# Patient Record
Sex: FEMALE | Race: WHITE | Employment: OTHER | URBAN - METROPOLITAN AREA
[De-identification: names, ages, dates, MRNs, and addresses within clinical notes are randomized per-mention and may not be internally consistent; named-entity substitution may affect disease eponyms.]

---

## 2017-04-28 PROBLEM — H91.90 HARD OF HEARING: Chronic | Status: ACTIVE | Noted: 2017-04-28

## 2017-04-28 PROBLEM — R53.1 WEAKNESS: Status: ACTIVE | Noted: 2017-04-28

## 2017-04-28 PROBLEM — M85.80 OSTEOPENIA: Chronic | Status: ACTIVE | Noted: 2017-04-28

## 2017-04-28 PROBLEM — M19.90 OA (OSTEOARTHRITIS): Chronic | Status: ACTIVE | Noted: 2017-04-28

## 2017-04-28 PROBLEM — R53.1 WEAKNESS: Status: RESOLVED | Noted: 2017-04-28 | Resolved: 2017-04-28

## 2017-04-29 PROBLEM — S22.080A COMPRESSION FRACTURE OF TWELFTH THORACIC VERTEBRA (HCC): Status: ACTIVE | Noted: 2017-04-29

## 2017-04-29 PROBLEM — R19.5 OCCULT BLOOD IN STOOLS: Status: ACTIVE | Noted: 2017-04-29

## 2017-04-29 PROBLEM — S32.020A CLOSED COMPRESSION FRACTURE OF SECOND LUMBAR VERTEBRA (HCC): Status: ACTIVE | Noted: 2017-04-29

## 2017-04-29 PROBLEM — S32.030A CLOSED COMPRESSION FRACTURE OF THIRD LUMBAR VERTEBRA (HCC): Status: ACTIVE | Noted: 2017-04-29

## 2017-04-30 PROBLEM — M79.604 BILATERAL LEG PAIN: Status: ACTIVE | Noted: 2017-04-30

## 2017-04-30 PROBLEM — M79.605 BILATERAL LEG PAIN: Status: ACTIVE | Noted: 2017-04-30

## 2018-05-10 ENCOUNTER — HOSPITAL ENCOUNTER (OUTPATIENT)
Dept: GENERAL RADIOLOGY | Age: 83
Discharge: HOME | End: 2018-05-12
Payer: COMMERCIAL

## 2018-05-10 DIAGNOSIS — R13.10 PROBLEMS WITH SWALLOWING AND MASTICATION: ICD-10-CM

## 2018-05-10 PROCEDURE — G8997 SWALLOW GOAL STATUS: HCPCS

## 2018-05-10 PROCEDURE — 74230 X-RAY XM SWLNG FUNCJ C+: CPT

## 2018-05-10 PROCEDURE — G8996 SWALLOW CURRENT STATUS: HCPCS

## 2018-05-10 PROCEDURE — 92611 MOTION FLUOROSCOPY/SWALLOW: CPT

## 2019-01-21 ENCOUNTER — TELEPHONE (OUTPATIENT)
Dept: VASCULAR SURGERY | Age: 84
End: 2019-01-21

## 2019-03-26 ENCOUNTER — OFFICE VISIT (OUTPATIENT)
Dept: VASCULAR SURGERY | Age: 84
End: 2019-03-26
Payer: COMMERCIAL

## 2019-03-26 VITALS — DIASTOLIC BLOOD PRESSURE: 72 MMHG | SYSTOLIC BLOOD PRESSURE: 124 MMHG | HEART RATE: 64 BPM

## 2019-03-26 DIAGNOSIS — I73.9 PAD (PERIPHERAL ARTERY DISEASE) (HCC): Primary | ICD-10-CM

## 2019-03-26 PROCEDURE — 99203 OFFICE O/P NEW LOW 30 MIN: CPT | Performed by: SURGERY

## 2019-04-18 ENCOUNTER — HOSPITAL ENCOUNTER (OUTPATIENT)
Age: 84
Discharge: HOME | End: 2019-04-20
Payer: COMMERCIAL

## 2019-04-18 PROCEDURE — 87088 URINE BACTERIA CULTURE: CPT

## 2019-04-21 LAB — URINE CULTURE, ROUTINE: NORMAL

## 2021-01-01 ENCOUNTER — HOSPITAL ENCOUNTER (INPATIENT)
Age: 86
LOS: 2 days | Discharge: HOSPICE | DRG: 871 | End: 2021-10-05
Attending: EMERGENCY MEDICINE | Admitting: INTERNAL MEDICINE
Payer: MEDICARE

## 2021-01-01 ENCOUNTER — OUTSIDE SERVICES (OUTPATIENT)
Dept: PODIATRY | Age: 86
End: 2021-01-01
Payer: MEDICARE

## 2021-01-01 ENCOUNTER — OUTSIDE SERVICES (OUTPATIENT)
Dept: PODIATRY | Age: 86
End: 2021-01-01
Payer: COMMERCIAL

## 2021-01-01 ENCOUNTER — APPOINTMENT (OUTPATIENT)
Dept: GENERAL RADIOLOGY | Age: 86
DRG: 871 | End: 2021-01-01
Payer: MEDICARE

## 2021-01-01 ENCOUNTER — APPOINTMENT (OUTPATIENT)
Dept: CT IMAGING | Age: 86
DRG: 871 | End: 2021-01-01
Payer: MEDICARE

## 2021-01-01 VITALS
DIASTOLIC BLOOD PRESSURE: 55 MMHG | BODY MASS INDEX: 15.79 KG/M2 | HEART RATE: 85 BPM | OXYGEN SATURATION: 93 % | TEMPERATURE: 99.5 F | WEIGHT: 73.2 LBS | SYSTOLIC BLOOD PRESSURE: 113 MMHG | HEIGHT: 57 IN | RESPIRATION RATE: 16 BRPM

## 2021-01-01 DIAGNOSIS — M79.675 PAIN IN LEFT TOE(S): ICD-10-CM

## 2021-01-01 DIAGNOSIS — B35.1 TINEA UNGUIUM: Primary | ICD-10-CM

## 2021-01-01 DIAGNOSIS — R62.7 FAILURE TO THRIVE IN ADULT: ICD-10-CM

## 2021-01-01 DIAGNOSIS — I73.9 PERIPHERAL VASCULAR DISEASE, UNSPECIFIED (HCC): ICD-10-CM

## 2021-01-01 DIAGNOSIS — R26.2 DIFFICULTY WALKING: ICD-10-CM

## 2021-01-01 DIAGNOSIS — J69.0 ASPIRATION PNEUMONIA OF RIGHT LOWER LOBE DUE TO GASTRIC SECRETIONS (HCC): Primary | ICD-10-CM

## 2021-01-01 DIAGNOSIS — M79.674 PAIN IN TOE OF RIGHT FOOT: ICD-10-CM

## 2021-01-01 DIAGNOSIS — E86.0 DEHYDRATION: ICD-10-CM

## 2021-01-01 LAB
ALBUMIN SERPL-MCNC: 3.2 G/DL (ref 3.5–5.2)
ALBUMIN SERPL-MCNC: 3.7 G/DL (ref 3.5–5.2)
ALP BLD-CCNC: 163 U/L (ref 35–104)
ALP BLD-CCNC: 195 U/L (ref 35–104)
ALT SERPL-CCNC: 20 U/L (ref 0–32)
ALT SERPL-CCNC: 23 U/L (ref 0–32)
ANION GAP SERPL CALCULATED.3IONS-SCNC: 12 MMOL/L (ref 7–16)
ANION GAP SERPL CALCULATED.3IONS-SCNC: 12 MMOL/L (ref 7–16)
ANISOCYTOSIS: ABNORMAL
AST SERPL-CCNC: 17 U/L (ref 0–31)
AST SERPL-CCNC: 19 U/L (ref 0–31)
BACTERIA: ABNORMAL /HPF
BASOPHILIC STIPPLING: ABNORMAL
BASOPHILS ABSOLUTE: 0.03 E9/L (ref 0–0.2)
BASOPHILS RELATIVE PERCENT: 0.2 % (ref 0–2)
BILIRUB SERPL-MCNC: 0.6 MG/DL (ref 0–1.2)
BILIRUB SERPL-MCNC: 0.9 MG/DL (ref 0–1.2)
BILIRUBIN URINE: ABNORMAL
BLOOD, URINE: NEGATIVE
BUN BLDV-MCNC: 27 MG/DL (ref 6–23)
BUN BLDV-MCNC: 28 MG/DL (ref 6–23)
CALCIUM SERPL-MCNC: 8.3 MG/DL (ref 8.6–10.2)
CALCIUM SERPL-MCNC: 9 MG/DL (ref 8.6–10.2)
CHLORIDE BLD-SCNC: 101 MMOL/L (ref 98–107)
CHLORIDE BLD-SCNC: 107 MMOL/L (ref 98–107)
CLARITY: ABNORMAL
CO2: 26 MMOL/L (ref 22–29)
CO2: 29 MMOL/L (ref 22–29)
COLOR: YELLOW
CREAT SERPL-MCNC: 0.4 MG/DL (ref 0.5–1)
CREAT SERPL-MCNC: 0.4 MG/DL (ref 0.5–1)
EKG ATRIAL RATE: 103 BPM
EKG P AXIS: 109 DEGREES
EKG P-R INTERVAL: 136 MS
EKG Q-T INTERVAL: 344 MS
EKG QRS DURATION: 66 MS
EKG QTC CALCULATION (BAZETT): 450 MS
EKG R AXIS: 111 DEGREES
EKG T AXIS: 15 DEGREES
EKG VENTRICULAR RATE: 103 BPM
EOSINOPHILS ABSOLUTE: 0.01 E9/L (ref 0.05–0.5)
EOSINOPHILS RELATIVE PERCENT: 0.1 % (ref 0–6)
FERRITIN: 252 NG/ML
FOLATE: >20 NG/ML (ref 4.8–24.2)
GFR AFRICAN AMERICAN: >60
GFR AFRICAN AMERICAN: >60
GFR NON-AFRICAN AMERICAN: >60 ML/MIN/1.73
GFR NON-AFRICAN AMERICAN: >60 ML/MIN/1.73
GLUCOSE BLD-MCNC: 114 MG/DL (ref 74–99)
GLUCOSE BLD-MCNC: 136 MG/DL (ref 74–99)
GLUCOSE URINE: NEGATIVE MG/DL
HCT VFR BLD CALC: 32 % (ref 34–48)
HCT VFR BLD CALC: 36.5 % (ref 34–48)
HEMOGLOBIN: 11.7 G/DL (ref 11.5–15.5)
HEMOGLOBIN: 9.8 G/DL (ref 11.5–15.5)
IMMATURE GRANULOCYTES #: 0.09 E9/L
IMMATURE GRANULOCYTES %: 0.5 % (ref 0–5)
IRON SATURATION: 13 % (ref 15–50)
IRON: 31 MCG/DL (ref 37–145)
KETONES, URINE: 15 MG/DL
LACTIC ACID: 1.6 MMOL/L (ref 0.5–2.2)
LEUKOCYTE ESTERASE, URINE: NEGATIVE
LYMPHOCYTES ABSOLUTE: 0.33 E9/L (ref 1.5–4)
LYMPHOCYTES RELATIVE PERCENT: 1.8 % (ref 20–42)
MAGNESIUM: 2.1 MG/DL (ref 1.6–2.6)
MCH RBC QN AUTO: 30.7 PG (ref 26–35)
MCH RBC QN AUTO: 31 PG (ref 26–35)
MCHC RBC AUTO-ENTMCNC: 30.6 % (ref 32–34.5)
MCHC RBC AUTO-ENTMCNC: 32.1 % (ref 32–34.5)
MCV RBC AUTO: 100.3 FL (ref 80–99.9)
MCV RBC AUTO: 96.8 FL (ref 80–99.9)
METER GLUCOSE: 104 MG/DL (ref 74–99)
MONOCYTES ABSOLUTE: 0.59 E9/L (ref 0.1–0.95)
MONOCYTES RELATIVE PERCENT: 3.3 % (ref 2–12)
NEUTROPHILS ABSOLUTE: 16.92 E9/L (ref 1.8–7.3)
NEUTROPHILS RELATIVE PERCENT: 94.1 % (ref 43–80)
NITRITE, URINE: NEGATIVE
PDW BLD-RTO: 14.2 FL (ref 11.5–15)
PDW BLD-RTO: 14.3 FL (ref 11.5–15)
PH UA: 5.5 (ref 5–9)
PLATELET # BLD: 481 E9/L (ref 130–450)
PLATELET # BLD: 569 E9/L (ref 130–450)
PMV BLD AUTO: 8.3 FL (ref 7–12)
PMV BLD AUTO: 8.5 FL (ref 7–12)
POTASSIUM SERPL-SCNC: 4.1 MMOL/L (ref 3.5–5)
POTASSIUM SERPL-SCNC: 4.4 MMOL/L (ref 3.5–5)
PROCALCITONIN: 0.24 NG/ML (ref 0–0.08)
PROTEIN UA: 30 MG/DL
RBC # BLD: 3.19 E12/L (ref 3.5–5.5)
RBC # BLD: 3.77 E12/L (ref 3.5–5.5)
RBC UA: ABNORMAL /HPF (ref 0–2)
SARS-COV-2, NAAT: NOT DETECTED
SARS-COV-2, NAAT: NOT DETECTED
SODIUM BLD-SCNC: 142 MMOL/L (ref 132–146)
SODIUM BLD-SCNC: 145 MMOL/L (ref 132–146)
SPECIFIC GRAVITY UA: >=1.03 (ref 1–1.03)
TOTAL IRON BINDING CAPACITY: 247 MCG/DL (ref 250–450)
TOTAL PROTEIN: 5.8 G/DL (ref 6.4–8.3)
TOTAL PROTEIN: 6.9 G/DL (ref 6.4–8.3)
URINE CULTURE, ROUTINE: NORMAL
UROBILINOGEN, URINE: 1 E.U./DL
VITAMIN B-12: >2000 PG/ML (ref 211–946)
WBC # BLD: 17.7 E9/L (ref 4.5–11.5)
WBC # BLD: 18 E9/L (ref 4.5–11.5)
WBC UA: ABNORMAL /HPF (ref 0–5)

## 2021-01-01 PROCEDURE — 6360000002 HC RX W HCPCS: Performed by: INTERNAL MEDICINE

## 2021-01-01 PROCEDURE — G0378 HOSPITAL OBSERVATION PER HR: HCPCS

## 2021-01-01 PROCEDURE — 1200000000 HC SEMI PRIVATE

## 2021-01-01 PROCEDURE — 87088 URINE BACTERIA CULTURE: CPT

## 2021-01-01 PROCEDURE — 93010 ELECTROCARDIOGRAM REPORT: CPT | Performed by: INTERNAL MEDICINE

## 2021-01-01 PROCEDURE — 87635 SARS-COV-2 COVID-19 AMP PRB: CPT

## 2021-01-01 PROCEDURE — 97161 PT EVAL LOW COMPLEX 20 MIN: CPT

## 2021-01-01 PROCEDURE — 85027 COMPLETE CBC AUTOMATED: CPT

## 2021-01-01 PROCEDURE — 96361 HYDRATE IV INFUSION ADD-ON: CPT

## 2021-01-01 PROCEDURE — 82728 ASSAY OF FERRITIN: CPT

## 2021-01-01 PROCEDURE — 96366 THER/PROPH/DIAG IV INF ADDON: CPT

## 2021-01-01 PROCEDURE — 2580000003 HC RX 258: Performed by: EMERGENCY MEDICINE

## 2021-01-01 PROCEDURE — 99307 SBSQ NF CARE SF MDM 10: CPT | Performed by: PODIATRIST

## 2021-01-01 PROCEDURE — 80053 COMPREHEN METABOLIC PANEL: CPT

## 2021-01-01 PROCEDURE — 2580000003 HC RX 258: Performed by: INTERNAL MEDICINE

## 2021-01-01 PROCEDURE — 96372 THER/PROPH/DIAG INJ SC/IM: CPT

## 2021-01-01 PROCEDURE — 97165 OT EVAL LOW COMPLEX 30 MIN: CPT

## 2021-01-01 PROCEDURE — 96365 THER/PROPH/DIAG IV INF INIT: CPT

## 2021-01-01 PROCEDURE — 83550 IRON BINDING TEST: CPT

## 2021-01-01 PROCEDURE — 6360000002 HC RX W HCPCS: Performed by: EMERGENCY MEDICINE

## 2021-01-01 PROCEDURE — 92610 EVALUATE SWALLOWING FUNCTION: CPT

## 2021-01-01 PROCEDURE — 83605 ASSAY OF LACTIC ACID: CPT

## 2021-01-01 PROCEDURE — 93005 ELECTROCARDIOGRAM TRACING: CPT | Performed by: EMERGENCY MEDICINE

## 2021-01-01 PROCEDURE — 99222 1ST HOSP IP/OBS MODERATE 55: CPT | Performed by: OTOLARYNGOLOGY

## 2021-01-01 PROCEDURE — 82607 VITAMIN B-12: CPT

## 2021-01-01 PROCEDURE — 84145 PROCALCITONIN (PCT): CPT

## 2021-01-01 PROCEDURE — 70450 CT HEAD/BRAIN W/O DYE: CPT

## 2021-01-01 PROCEDURE — 36415 COLL VENOUS BLD VENIPUNCTURE: CPT

## 2021-01-01 PROCEDURE — 85025 COMPLETE CBC W/AUTO DIFF WBC: CPT

## 2021-01-01 PROCEDURE — 11721 DEBRIDE NAIL 6 OR MORE: CPT | Performed by: PODIATRIST

## 2021-01-01 PROCEDURE — 83540 ASSAY OF IRON: CPT

## 2021-01-01 PROCEDURE — 97535 SELF CARE MNGMENT TRAINING: CPT

## 2021-01-01 PROCEDURE — 82962 GLUCOSE BLOOD TEST: CPT

## 2021-01-01 PROCEDURE — 99284 EMERGENCY DEPT VISIT MOD MDM: CPT

## 2021-01-01 PROCEDURE — 2700000000 HC OXYGEN THERAPY PER DAY

## 2021-01-01 PROCEDURE — 83735 ASSAY OF MAGNESIUM: CPT

## 2021-01-01 PROCEDURE — 71045 X-RAY EXAM CHEST 1 VIEW: CPT

## 2021-01-01 PROCEDURE — 96360 HYDRATION IV INFUSION INIT: CPT

## 2021-01-01 PROCEDURE — 31575 DIAGNOSTIC LARYNGOSCOPY: CPT | Performed by: OTOLARYNGOLOGY

## 2021-01-01 PROCEDURE — 81001 URINALYSIS AUTO W/SCOPE: CPT

## 2021-01-01 PROCEDURE — 97530 THERAPEUTIC ACTIVITIES: CPT

## 2021-01-01 PROCEDURE — 73030 X-RAY EXAM OF SHOULDER: CPT

## 2021-01-01 PROCEDURE — 6370000000 HC RX 637 (ALT 250 FOR IP): Performed by: INTERNAL MEDICINE

## 2021-01-01 PROCEDURE — 82746 ASSAY OF FOLIC ACID SERUM: CPT

## 2021-01-01 RX ORDER — SODIUM CHLORIDE 9 MG/ML
25 INJECTION, SOLUTION INTRAVENOUS PRN
Status: DISCONTINUED | OUTPATIENT
Start: 2021-01-01 | End: 2021-01-01 | Stop reason: HOSPADM

## 2021-01-01 RX ORDER — ONDANSETRON 2 MG/ML
4 INJECTION INTRAMUSCULAR; INTRAVENOUS EVERY 6 HOURS PRN
Status: DISCONTINUED | OUTPATIENT
Start: 2021-01-01 | End: 2021-01-01 | Stop reason: HOSPADM

## 2021-01-01 RX ORDER — SODIUM CHLORIDE 0.9 % (FLUSH) 0.9 %
5-40 SYRINGE (ML) INJECTION EVERY 12 HOURS SCHEDULED
Status: DISCONTINUED | OUTPATIENT
Start: 2021-01-01 | End: 2021-01-01 | Stop reason: HOSPADM

## 2021-01-01 RX ORDER — ACETAMINOPHEN 650 MG/1
650 SUPPOSITORY RECTAL EVERY 6 HOURS PRN
Status: DISCONTINUED | OUTPATIENT
Start: 2021-01-01 | End: 2021-01-01 | Stop reason: HOSPADM

## 2021-01-01 RX ORDER — ONDANSETRON 4 MG/1
4 TABLET, ORALLY DISINTEGRATING ORAL EVERY 8 HOURS PRN
Status: DISCONTINUED | OUTPATIENT
Start: 2021-01-01 | End: 2021-01-01 | Stop reason: HOSPADM

## 2021-01-01 RX ORDER — 0.9 % SODIUM CHLORIDE 0.9 %
1000 INTRAVENOUS SOLUTION INTRAVENOUS ONCE
Status: COMPLETED | OUTPATIENT
Start: 2021-01-01 | End: 2021-01-01

## 2021-01-01 RX ORDER — ACETAMINOPHEN 325 MG/1
650 TABLET ORAL EVERY 6 HOURS PRN
Status: DISCONTINUED | OUTPATIENT
Start: 2021-01-01 | End: 2021-01-01 | Stop reason: HOSPADM

## 2021-01-01 RX ORDER — POLYETHYLENE GLYCOL 3350 17 G/17G
17 POWDER, FOR SOLUTION ORAL DAILY PRN
Status: DISCONTINUED | OUTPATIENT
Start: 2021-01-01 | End: 2021-01-01 | Stop reason: HOSPADM

## 2021-01-01 RX ORDER — SODIUM CHLORIDE 9 MG/ML
INJECTION, SOLUTION INTRAVENOUS CONTINUOUS
Status: DISCONTINUED | OUTPATIENT
Start: 2021-01-01 | End: 2021-01-01

## 2021-01-01 RX ORDER — SODIUM CHLORIDE 0.9 % (FLUSH) 0.9 %
5-40 SYRINGE (ML) INJECTION PRN
Status: DISCONTINUED | OUTPATIENT
Start: 2021-01-01 | End: 2021-01-01 | Stop reason: HOSPADM

## 2021-01-01 RX ADMIN — SODIUM CHLORIDE 3000 MG: 900 INJECTION INTRAVENOUS at 23:44

## 2021-01-01 RX ADMIN — SODIUM CHLORIDE 1000 ML: 9 INJECTION, SOLUTION INTRAVENOUS at 14:48

## 2021-01-01 RX ADMIN — SODIUM CHLORIDE 3000 MG: 900 INJECTION INTRAVENOUS at 00:32

## 2021-01-01 RX ADMIN — ENOXAPARIN SODIUM 40 MG: 40 INJECTION SUBCUTANEOUS at 09:52

## 2021-01-01 RX ADMIN — SODIUM CHLORIDE 3000 MG: 900 INJECTION INTRAVENOUS at 12:03

## 2021-01-01 RX ADMIN — SODIUM CHLORIDE: 9 INJECTION, SOLUTION INTRAVENOUS at 20:52

## 2021-01-01 RX ADMIN — SODIUM CHLORIDE, PRESERVATIVE FREE 10 ML: 5 INJECTION INTRAVENOUS at 20:51

## 2021-01-01 RX ADMIN — ACETAMINOPHEN 650 MG: 325 TABLET ORAL at 21:22

## 2021-01-01 RX ADMIN — AMPICILLIN SODIUM AND SULBACTAM SODIUM 3000 MG: 2; 1 INJECTION, POWDER, FOR SOLUTION INTRAMUSCULAR; INTRAVENOUS at 17:07

## 2021-01-01 RX ADMIN — ENOXAPARIN SODIUM 40 MG: 40 INJECTION SUBCUTANEOUS at 20:52

## 2021-01-01 RX ADMIN — SODIUM CHLORIDE 3000 MG: 900 INJECTION INTRAVENOUS at 05:25

## 2021-01-01 RX ADMIN — ENOXAPARIN SODIUM 30 MG: 100 INJECTION SUBCUTANEOUS at 09:00

## 2021-01-01 ASSESSMENT — ENCOUNTER SYMPTOMS
COUGH: 1
ABDOMINAL PAIN: 0
BLOOD IN STOOL: 0
VOICE CHANGE: 1
BACK PAIN: 0
VOMITING: 0
SHORTNESS OF BREATH: 0
COLOR CHANGE: 0
NAUSEA: 1
RHINORRHEA: 0

## 2021-01-01 ASSESSMENT — PAIN DESCRIPTION - DESCRIPTORS: DESCRIPTORS: ACHING

## 2021-01-01 ASSESSMENT — PAIN DESCRIPTION - PROGRESSION: CLINICAL_PROGRESSION: NOT CHANGED

## 2021-01-01 ASSESSMENT — PAIN SCALES - GENERAL
PAINLEVEL_OUTOF10: 0
PAINLEVEL_OUTOF10: 6
PAINLEVEL_OUTOF10: 0
PAINLEVEL_OUTOF10: 0

## 2021-01-01 ASSESSMENT — PAIN DESCRIPTION - FREQUENCY: FREQUENCY: CONTINUOUS

## 2021-01-01 ASSESSMENT — PAIN DESCRIPTION - PAIN TYPE: TYPE: ACUTE PAIN;CHRONIC PAIN

## 2021-01-01 ASSESSMENT — PAIN DESCRIPTION - LOCATION: LOCATION: SHOULDER

## 2021-01-01 ASSESSMENT — PAIN - FUNCTIONAL ASSESSMENT: PAIN_FUNCTIONAL_ASSESSMENT: PREVENTS OR INTERFERES WITH MANY ACTIVE NOT PASSIVE ACTIVITIES

## 2021-01-01 ASSESSMENT — PAIN DESCRIPTION - ONSET: ONSET: ON-GOING

## 2021-01-01 ASSESSMENT — PAIN DESCRIPTION - ORIENTATION: ORIENTATION: RIGHT

## 2021-02-20 NOTE — PROGRESS NOTES
[id number] [facility] patient     Chief Complaint   Patient presents with   [facility] Doctor     pt has pvd red discolored feet b/l    Toe Pain    Bunions    Hammer Toe       Subjective: Rosetta Higuera is seen in nursing home  per nursing for foot and nail care. Pt currently has complaint of thickened, painful, elongated nails that he/she cannot manage by themselves. Pt. Relates pain to nails with shoe gear. Pt's primary care physician is Hui Townsend DO.2/1/21 this is a new patient seen per nursing  No past medical history on file. Allergies   Allergen Reactions    Anti-Inflammatory Enzyme [Nutritional Supplements]     Antihistamines, Diphenhydramine-Type      Current Outpatient Medications on File Prior to Visit   Medication Sig Dispense Refill    nystatin (MYCOSTATIN) 002260 UNIT/GM powder Apply 2-3 times daily under breasts to affected area. 1 Bottle 1    acetaminophen (TYLENOL) 325 MG tablet Take 2 tablets by mouth every 4 hours as needed for Pain (Patient not taking: Reported on 9/18/2019) 120 tablet 3     No current facility-administered medications on file prior to visit. Review of Systems  Objective:  General: AAO x 3 in NAD.     Derm  Toenail Description  Sites of Onychomycosis Involvement (Check affected area)  [x] [x] [x] [x] [x] [x] [x] [x] [x] [x]  5 4 3 2 1 1 2 3 4 5                          Right                                        Left    Thickness  [x] [x] [x] [x] [x] [x] [x] [x] [x] [x]  5 4 3 2 1 1 2 3 4 5                         Right                                        Left    Dystrophic Changes   [x] [x] [x] [x] [x] [x] [x] [x] [x] [x]  5 4 3 2 1 1 2 3 4 5                         Right                                        Left    Color  [x] [x] [x] [x] [x] [x] [x] [x] [x] [x]  5 4 3 2 1 1 2 3 4 5                          Right                                        Left    Incurvation/Ingrowin   [] [] [] [] [] [] [] [] [] []  5 4 3 2 1 1 2 3 4 5 Patient was asymptomatic after debridement    2/20/2021      Electronically signed by Cal Gómez DPM on 2/20/2021 at 12:27 PM  2/20/2021

## 2021-05-17 NOTE — PROGRESS NOTES
[id number] [facility] patient     Chief Complaint   Patient presents with    Toe Pain       Subjective: Manny Pichardo is seen in nursing home  per nursing for foot and nail care. Pt currently has complaint of thickened, painful, elongated nails that he/she cannot manage by themselves. Pt. Relates pain to nails with shoe gear. Pt's primary care physician is Ryann Angulo DO.4/28/21 this is a new patient seen per nursing  No past medical history on file. Allergies   Allergen Reactions    Anti-Inflammatory Enzyme [Nutritional Supplements]     Antihistamines, Diphenhydramine-Type      Current Outpatient Medications on File Prior to Visit   Medication Sig Dispense Refill    nystatin (MYCOSTATIN) 450038 UNIT/GM powder Apply 2-3 times daily under breasts to affected area. 1 Bottle 1    acetaminophen (TYLENOL) 325 MG tablet Take 2 tablets by mouth every 4 hours as needed for Pain (Patient not taking: Reported on 9/18/2019) 120 tablet 3     No current facility-administered medications on file prior to visit. Review of Systems  Objective:  General: AAO x 3 in NAD.     Derm  Toenail Description  Sites of Onychomycosis Involvement (Check affected area)  [x] [x] [x] [x] [x] [x] [x] [x] [x] [x]  5 4 3 2 1 1 2 3 4 5                          Right                                        Left    Thickness  [x] [x] [x] [x] [x] [x] [x] [x] [x] [x]  5 4 3 2 1 1 2 3 4 5                         Right                                        Left    Dystrophic Changes   [x] [x] [x] [x] [x] [x] [x] [x] [x] [x]  5 4 3 2 1 1 2 3 4 5                         Right                                        Left    Color  [x] [x] [x] [x] [x] [x] [x] [x] [x] [x]  5 4 3 2 1 1 2 3 4 5                          Right                                        Left    Incurvation/Ingrowin   [] [] [] [] [] [] [] [] [] []  5 4 3 2 1 1 2 3 4 5                         Right                                        Left Inflammation/Pain   [x] [x] [x] [x] [x] [x] [x] [x] [x] [x]  5 4 3  2 1 1 2 3 4 5                         Right                                        Left      Nails that are described above are all elongated thickened pitting mycotic yellowish incurvated causing pain with both shoe gear. Palpation      Dermatologic Exam:  Skin lesion/ulceration neg  Skin neg ulcer  Callus   Musculoskeletal:         Vascular:  Pulses were absent DP and PT bilaterally skin changes noted thick pitting mycotic nails lack of hair growth to lower extremity. Neurological:  Sensation present to light touch to level of digits, Bilateral.    Foot Exam    General  General Appearance: appears stated age and healthy   Orientation: alert and oriented to person, place, and time   Assistance: walker use              Ortho Exam    Assessment:  80 y.o. female with:   1. Tinea unguium    2. Pain in left toe(s)    3. Pain in toe of right foot    4. Peripheral vascular disease, unspecified ([address]    5. Difficulty walking     No orders of the defined types were placed in this encounter. Plan:   Pt was evaluated and examined. Patient was given personalized discharge instructions. Nails 1-10 were debrided in length and thickness sharply with a nail nipper and  without incident. Pt will follow up in 9 weeks or sooner if any problems arise. Diagnosis was discussed with the pt and all of their questions were answered in detail. Proper foot hygiene and care was discussed with the pt. Patient to check feet daily and contact the office with any questions/problems/concerns. Other comorbidity noted and will be managed by PCP. Pain waiver discussed with patient and confirmed. Debridement of all painful nails was performed with both manual and electrical debridement to prevent infection and/or ulceration. Patient tolerated the debridement well, without any complaints.   Patient was asymptomatic after debridement    5/17/2021      Electronically signed by Sushant Manzo DPM on 5/17/2021 at 9:48 AM  5/17/2021

## 2021-10-03 PROBLEM — J69.0 ASPIRATION PNEUMONIA OF RIGHT UPPER LOBE DUE TO GASTRIC SECRETIONS (HCC): Status: ACTIVE | Noted: 2021-01-01

## 2021-10-03 PROBLEM — S42.309A HUMERUS FRACTURE: Status: ACTIVE | Noted: 2021-01-01

## 2021-10-03 PROBLEM — M79.605 BILATERAL LEG PAIN: Status: RESOLVED | Noted: 2017-04-30 | Resolved: 2021-01-01

## 2021-10-03 PROBLEM — S32.030A CLOSED COMPRESSION FRACTURE OF THIRD LUMBAR VERTEBRA (HCC): Status: RESOLVED | Noted: 2017-04-29 | Resolved: 2021-01-01

## 2021-10-03 PROBLEM — S32.020A CLOSED COMPRESSION FRACTURE OF SECOND LUMBAR VERTEBRA (HCC): Status: RESOLVED | Noted: 2017-04-29 | Resolved: 2021-01-01

## 2021-10-03 PROBLEM — S22.080A COMPRESSION FRACTURE OF TWELFTH THORACIC VERTEBRA (HCC): Status: RESOLVED | Noted: 2017-04-29 | Resolved: 2021-01-01

## 2021-10-03 PROBLEM — E86.0 DEHYDRATION: Status: ACTIVE | Noted: 2021-01-01

## 2021-10-03 PROBLEM — M79.604 BILATERAL LEG PAIN: Status: RESOLVED | Noted: 2017-04-30 | Resolved: 2021-01-01

## 2021-10-03 NOTE — ED PROVIDER NOTES
ED PROVIDER NOTE    Chief Complaint   Patient presents with    Dehydration     with failure to thrive pt not eating or drinking since she fell last week and fractured her right shoulder       HPI:  10/3/21,   Time: 2:43 PM EDT       Nellie Saucedo is a 80 y.o. female presenting to the ED for dehydration and failure to thrive. Gradual onset over the past 2 weeks since a fall at assisted living. Saw her PCP 1 week later and found to have right shoulder fracture. Since then progressively worsening pain to right shoulder, worse w/ movement. Also decreased PO intake and urine output. Chills, nausea, cough productive of clear sputum. No fever, vomiting, diarrhea, abdominal pain, shortness of breath. No recent falls. States she sometimes has right sided chest pain but none currently. DNR-CCA. Daughter states that patient is not drinking fluids because she doesn't want to have the aides to have to take her to the bathroom given how much pain she is having. Chart review: hx of OA, osteopenia, lumbar and thoracic compression fxs. Review of Systems:     Review of Systems   Constitutional: Positive for appetite change and chills. Negative for fever. HENT: Negative for congestion and rhinorrhea. Eyes: Negative for visual disturbance. Respiratory: Positive for cough. Negative for shortness of breath. Cardiovascular: Negative for chest pain. Gastrointestinal: Positive for nausea. Negative for abdominal pain, blood in stool and vomiting. Genitourinary: Negative for decreased urine volume and difficulty urinating. Musculoskeletal: Positive for arthralgias. Negative for back pain and neck pain. Skin: Negative for color change. Neurological: Negative for dizziness, syncope, weakness, light-headedness, numbness and headaches.         --------------------------------------------- PAST HISTORY ---------------------------------------------  Past Medical History:   History reviewed.  No pertinent past medical history. Past Surgical History:   Past Surgical History:   Procedure Laterality Date    HERNIA REPAIR      HYSTERECTOMY      TONSILLECTOMY      UPPER GASTROINTESTINAL ENDOSCOPY  05/01/2017       Social History:   Social History     Socioeconomic History    Marital status:      Spouse name: None    Number of children: None    Years of education: None    Highest education level: None   Occupational History    None   Tobacco Use    Smoking status: Never Smoker    Smokeless tobacco: Never Used   Substance and Sexual Activity    Alcohol use: No    Drug use: No    Sexual activity: Not Currently   Other Topics Concern    None   Social History Narrative    None     Social Determinants of Health     Financial Resource Strain:     Difficulty of Paying Living Expenses:    Food Insecurity:     Worried About Running Out of Food in the Last Year:     Ran Out of Food in the Last Year:    Transportation Needs:     Lack of Transportation (Medical):  Lack of Transportation (Non-Medical):    Physical Activity:     Days of Exercise per Week:     Minutes of Exercise per Session:    Stress:     Feeling of Stress :    Social Connections:     Frequency of Communication with Friends and Family:     Frequency of Social Gatherings with Friends and Family:     Attends [employer] Services:     Active Member of Clubs or Organizations:     Attends Club or Organization Meetings:     Marital Status:    Intimate Partner Violence:     Fear of Current or Ex-Partner:     Emotionally Abused:     Physically Abused:     Sexually Abused:        Family History:   History reviewed. No pertinent family history. The patients home medications have been reviewed. Allergies:    Allergies   Allergen Reactions    Anti-Inflammatory Enzyme [Nutritional Supplements]     Antihistamines, Diphenhydramine-Type            ---------------------------------------------------PHYSICAL WBC 18.0 (*)     Platelets 087 (*)     Neutrophils % 94.1 (*)     Lymphocytes % 1.8 (*)     Neutrophils Absolute 16.92 (*)     Lymphocytes Absolute 0.33 (*)     Eosinophils Absolute 0.01 (*)     All other components within normal limits   COMPREHENSIVE METABOLIC PANEL - Abnormal; Notable for the following components:    Glucose 136 (*)     BUN 28 (*)     CREATININE 0.4 (*)     Alkaline Phosphatase 195 (*)     All other components within normal limits   URINALYSIS WITH MICROSCOPIC - Abnormal; Notable for the following components:    Bilirubin Urine SMALL (*)     Ketones, Urine 15 (*)     Protein, UA 30 (*)     Bacteria, UA FEW (*)     All other components within normal limits   COVID-19, RAPID   CULTURE, URINE   MAGNESIUM   LACTIC ACID, PLASMA       RADIOLOGY:  Interpreted personally and by Radiologist.  XR CHEST PORTABLE   Final Result   Bilateral lower lobe infiltrates suspect pneumonia         XR SHOULDER RIGHT (MIN 2 VIEWS)    (Results Pending)       EKG: This EKG is signed and interpreted by the EP. Sinus rhythm w/ PACs, vent rate 100bpm, RAD, nonspecific ST-T abnormality in anterolateral leads new compared w/ prior EKG.      ------------------------- NURSING NOTES AND VITALS REVIEWED ---------------------------   The nursing notes within the ED encounter and vital signs as below have been reviewed by myself. /81   Pulse 86   Temp 98 °F (36.7 °C) (Temporal)   Resp 18   Ht 4' 9\" (1.448 m)   Wt 80 lb (36.3 kg)   SpO2 90%   BMI 17.31 kg/m²   Oxygen Saturation Interpretation: Abnormal    The patients available past medical records and past encounters were reviewed.         ------------------------------ ED COURSE/MEDICAL DECISION MAKING----------------------  Medications   ampicillin-sulbactam (UNASYN) 3000 mg ivpb minibag (has no administration in time range)   ampicillin-sulbactam (UNASYN) 3000 mg ivpb minibag (has no administration in time range)   0.9 % sodium chloride bolus (1,000 mLs IntraVENous New Bag 10/3/21 0626)       Counseling: The emergency provider has spoken with the patient and daughter and discussed todays results, in addition to providing specific details for the plan of care and counseling regarding the diagnosis and prognosis. Questions are answered at this time and they are agreeable with the plan. ED Course/Medical Decision Makin y.o. female here with dehydration and failure to thrive. Here from assisted living facility. Tachycardic on arrival, clinically dehydrated. Mild hypoxia, 90% on room air. Workup notable for leukocytosis, elevated BUN, pneumonia, ketonuria--suspect aspiration, has been having some dysphagia and coughing clear secretions. Admitted in stable condition for further management.       --------------------------------- IMPRESSION AND DISPOSITION ---------------------------------    IMPRESSION  1. Aspiration pneumonia of right lower lobe due to gastric secretions (HCC)    2. Failure to thrive in adult    3. Dehydration        DISPOSITION  Disposition: Admit to med/surg floor  Patient condition is stable    NOTE: This report was transcribed using voice recognition software.  Every effort was made to ensure accuracy; however, inadvertent computerized transcription errors may be present    Barbara Corbin MD  Attending Emergency Physician         Barbara Corbin MD  10/03/21 0666

## 2021-10-03 NOTE — LETTER
[place] Department Medicaid  CERTIFICATION OF NECESSITY  FOR NON-EMERGENCY TRANSPORTATION   BY GROUND AMBULANCE      Individual Information   1. Name: Christiano Zaidi 2. [place] Medicaid Billing Number:    3. Address: [address]  [address] [address]      Transportation Provider Information   4. Provider Name: neto   5. [place] Medicaid Provider Number:  National Provider Identifier (NPI):      Certification  7. Criteria:  During transport, this individual requires:  [x] Medical treatment or continuous     supervision by an EMT. [x] The administration or regulation of oxygen by another person. [] Supervised protective restraint. 8. Period Beginning Date: 10/5/2021    9. Length  [x] Not more than 10 day(s)  [] One Year     Additional Information Relevant to Certification   10. Comments or Explanations, If Necessary or Appropriate     Unable to regulate O2      Poor trunk control    Deconditioned    bedbound     Certifying Practitioner Information   11. Name of Practitioner: Gaurav Soliz DO   12. [place] Medicaid Provider Number, If Applicable:  [address] Provider Identifier (NPI):      Signature Information   14. Date of Signature: 10/5/2021  15. Name of Person Signing: Alem Hunt RN    16. Signature and Professional Designation: 10/5/2021 Alem Hunt, [address] [place] [id number]  Rev. 7/2015        [address] Encounter Date/Time: 10/3/2021 Bradley Fortune Account: [de-identified]    MRN: [id number]    Patient: Niranjan Serial #: [id number]      ENCOUNTER          Patient Class: I Private Enc?   No Unit  BD: [address] [facility] [id number]   Hospital Service: Med/Surg   Encounter DX: Dehydration [E86.0]   ADM Provider: Kely Hitchcock DO   Procedure:     ATT Provider: Kely Hitchcock DO   REF Provider:        Admission DX: Dehydration, Failure to thrive in adult, Aspiration pneumonia of right lower lobe due to gastric secretions ([facility] [address] Aspiration pneumonia

## 2021-10-04 PROBLEM — E43 SEVERE PROTEIN-CALORIE MALNUTRITION (HCC): Status: ACTIVE | Noted: 2021-01-01

## 2021-10-04 PROBLEM — J18.9 PNEUMONIA: Status: ACTIVE | Noted: 2021-01-01

## 2021-10-04 NOTE — CONSULTS
Department of Orthopedic Surgery  Resident Consult Note          Reason for Consult:  Right shoulder pain    HISTORY OF PRESENT ILLNESS:       Patient is a 80 y.o. female with past medical history of dehydration, failure to thrive, weakness who pre is sents with right shoulder pain after a fall. Patient is currently unable to respond to questioning. Thorough history was not able to be obtained at this time. .      Denies numbness/tingling/paresthesias. Denies any other orthopedic complaints at this time. Past Medical History:    History reviewed. No pertinent past medical history. Past Surgical History:        Procedure Laterality Date    HERNIA REPAIR      HYSTERECTOMY      TONSILLECTOMY      UPPER GASTROINTESTINAL ENDOSCOPY  05/01/2017     Current Medications:   Current Facility-Administered Medications: sodium chloride flush 0.9 % injection 5-40 mL, 5-40 mL, IntraVENous, 2 times per day  sodium chloride flush 0.9 % injection 5-40 mL, 5-40 mL, IntraVENous, PRN  0.9 % sodium chloride infusion, 25 mL, IntraVENous, PRN  enoxaparin (LOVENOX) injection 40 mg, 40 mg, SubCUTAneous, Daily  ondansetron (ZOFRAN-ODT) disintegrating tablet 4 mg, 4 mg, Oral, Q8H PRN **OR** ondansetron (ZOFRAN) injection 4 mg, 4 mg, IntraVENous, Q6H PRN  polyethylene glycol (GLYCOLAX) packet 17 g, 17 g, Oral, Daily PRN  acetaminophen (TYLENOL) tablet 650 mg, 650 mg, Oral, Q6H PRN **OR** acetaminophen (TYLENOL) suppository 650 mg, 650 mg, Rectal, Q6H PRN  0.9 % sodium chloride infusion, , IntraVENous, Continuous  ampicillin-sulbactam (UNASYN) 3000 mg ivpb minibag, 3,000 mg, IntraVENous, Q6H  Allergies:  Anti-inflammatory enzyme [nutritional supplements] and Antihistamines, diphenhydramine-type    Social History:   TOBACCO:   reports that she has never smoked. She has never used smokeless tobacco.  ETOH:   reports no history of alcohol use. DRUGS:   reports no history of drug use.   ACTIVITIES OF DAILY LIVING:    OCCUPATION:    Family History:   History reviewed. No pertinent family history. REVIEW OF SYSTEMS:  Not able to be obtained due to patient status    PHYSICAL EXAM:    VITALS:  /72   Pulse 102   Temp 98.7 °F (37.1 °C) (Temporal)   Resp 18   Ht 4' 9\" (1.448 m)   Wt 80 lb (36.3 kg)   SpO2 94%   BMI 17.31 kg/m²   CONSTITUTIONAL: Cachectic, malnourished, ill-appearing, unable to verbalize complaints. Physical exam limited due to patient cooperation/effort. MUSCULOSKELETAL:  Right upper Extremity:  · Skin circumferential ecchymosis noted up to the level of the distal humerus. No associated superficial lacerations or abrasions. Muscular atrophy noted throughout the entire right upper extremity  · TTP about the sh right oulder. No tenderness distally of the arm, elbow, forearm, wrist, hand or fingers. · Sensation intact distally in axillary, radial, ulnar, median nerve distributions. · Motor hard to assess due to patient effort. However she does oppose finger to thumb, extend thumb, extend wrist, wiggle fingers. · Compartment soft and compressible  · +2/4 radial ulnar pulses    Secondary Exam:   · leftUE: No obvious signs of trauma. -TTP to fingers, hand, wrist, forearm, elbow, humerus, shoulder or clavicle. · bilateralLE: No obvious signs of trauma. -TTP to foot, ankle, leg, knee, thigh, hip. · Pelvis: -TTP, -Log roll, -Heel strike     DATA:    CBC:   Lab Results   Component Value Date    WBC 17.7 10/04/2021    RBC 3.19 10/04/2021    HGB 9.8 10/04/2021    HCT 32.0 10/04/2021    .3 10/04/2021    MCH 30.7 10/04/2021    MCHC 30.6 10/04/2021    RDW 14.3 10/04/2021     10/04/2021    MPV 8.5 10/04/2021     PT/INR:  No results found for: PROTIME, INR    Radiology Review:  XR 3 views of the right shoulder demonstrate a displaced proximal humerus fracture. Fracture is in valgus with apex anteromedial.  Extensive osteopenia noted. No other fractures or dislocations noted.   No other bony or soft tissue abnormalities noted. IMPRESSION:  · Closed, right proximal humerus fracture. PLAN:  ·  nonweightbearing right upper extremity, sling for comfort  · Pain control per primary team  · DVT Prophylaxis per primary team  · No acute orthopedic intervention planned  · Follow up with Dr. Fco Duke as an outpatient if patients health improves  · All patient/family questions have been answered and patient is currently agreeable to plan.   · Discuss with Attending Dr. Fco Duke      Electronically signed by Compa Ivy DO on 10/4/2021 at 9:40 AM

## 2021-10-04 NOTE — PROGRESS NOTES
Dr. Geovanni Duncan-    Your patient is on a medication that requires a renal dose adjustment. Renal Function Assessment:    Date Body Weight IBW Adj. Body Weight SCr CrCl Dialysis status   10/4/2021 36.6 kg - - Ulitized 0.8 due to age ~29 ml/min -       Pharmacy has renally dose-adjusted the following medication(s):    Date Medication Original Dosing Regimen New Dosing Regimen   10/4/2021 Unasyn 3 g Q6H 3 g Q12H    Lovenox 40 mg QD 30 mg QD     These changes were made per protocol according to the [facility] Renal Function-Based Dose Adjustments Policy    *Please note this dose may need readjusted if your patient's renal function significantly improves. Please contact pharmacy with any questions regarding these changes. Thank you,   Nawaf Arias.  Landry Harris, PharmD 10/4/2021 12:44 PM

## 2021-10-04 NOTE — PLAN OF CARE
Problem: Daily Care:  Goal: Daily care needs are met  Description: Daily care needs are met  Outcome: Met This Shift     Problem: Discharge Planning:  Goal: Patients continuum of care needs are met  Description: Patients continuum of care needs are met  Outcome: Ongoing

## 2021-10-04 NOTE — H&P
[address]                  [address] [facility]                              HISTORY AND PHYSICAL    PATIENT NAME: Cecily Hardin                      :        1933  MED REC NO:   [id number]                            ROOM:       5402  ACCOUNT NO:   [de-identified]                           ADMIT DATE: 10/03/2021  PROVIDER:     Esteban Mcintosh DO    CHIEF COMPLAINT:  Weakness. HISTORY OF PRESENT ILLNESS:  The patient is an 25-year-old   female who presented to the emergency room with weakness, decreased oral  intake. She had a fall about two weeks ago. She was found to have a  right humerus fracture. She was seen in the emergency room. X-ray of  the right shoulder revealed angulated fracture of the humeral neck,  multiple displaced right-sided rib fractures. Chest x-ray with  bibasilar infiltrates. BUN 28, creatinine 0.4. The patient also with  hoarseness, which has been ongoing for several weeks. She was admitted  for further evaluation and treatment. PAST MEDICAL HISTORY:  Hard of hearing, osteoarthritis, osteopenia. PAST SURGICAL HISTORY:  Tonsillectomy, wisdom teeth extraction,  hysterectomy with BSO. SOCIAL HISTORY:  No tobacco.  No alcohol. The patient lives in assisted  living. MEDICATIONS:  Prior to admission, Mycostatin topical and Tylenol. REVIEW OF SYSTEMS:  Remarkable for above-stated chief complaint plus  hard of hearing. ALLERGIES:  ANTI-INFLAMMATORY ENZYMES, ANTIHISTAMINES. PHYSICAL EXAMINATION:  GENERAL APPEARANCE:  Reveals an 25-year-old  female who is  alert, cooperative, and a poor historian. The patient has hoarse when  she attempts to speak. VITAL SIGNS:  On admission, temperature 98 degrees, pulse 86,  respirations 18, blood pressure 132/81. HEENT:  Head:  Normocephalic, atraumatic. Eyes:  Pupils are equal and  reactive to light. Extraocular muscles intact.   Fundi not well  visualized. Nose:  No obstruction, polyp, or discharge noted. Mouth:   Mucosa without lesion. Pharynx:  Noninjected without exudate. NECK:  Supple. No JVD. No thyromegaly. No carotid bruits. HEART:  Regular rate and rhythm without murmur. LUNGS:  Decreased breath sounds at bases. ABDOMEN:  Positive bowel sounds. Soft, nontender. No rebound. No  guarding. No hepatosplenomegaly. No masses. BACK:  With significantly increased thoracic kyphosis. EXTREMITIES:  With minimal edema in the bilateral lower extremities. LYMPH NODES:  No adenopathy noted. SKIN:  With some minimal erythema in the bilateral lower extremities. IMPRESSION:  Dehydration, right humerus fracture, hoarseness, hard of  hearing, osteoarthritis, pneumonia. PLAN:  Admit. Empiric antibiotics. Swallow eval.  ENT and Ortho to  see. Discharge plan to skilled nursing facility when stable.         Darleen Cain DO    D: 10/04/2021 7:57:37       T: 10/04/2021 7:59:58     [id number]  Job#: [id number]     Doc#: [id number]    CC:

## 2021-10-04 NOTE — PROGRESS NOTES
Dr Aniya England notified of Ortho consult. Dr Winsome Marcano, [facility] OF [facility] notified of consult for hoarseness.

## 2021-10-04 NOTE — CONSULTS
OTOLARYNGOLOGY  CONSULT NOTE  10/4/2021    Physician Consulted: Dr. Jenna Liao  Reason for Consult: Hoarsness  Referring Physician: Dr. Roseann LANCE  Maria Elena Pacheco is a 80 y.o. female who ENT was consulted for evaluation of Hoarseness. Patient difficult to arouse. She states she lost her voice last week. Otherwise unable to obtain history due to clinical status. Review of Systems   Unable to perform ROS: Acuity of condition   HENT: Positive for voice change. History reviewed. No pertinent past medical history. Past Surgical History:   Procedure Laterality Date    HERNIA REPAIR      HYSTERECTOMY      TONSILLECTOMY      UPPER GASTROINTESTINAL ENDOSCOPY  05/01/2017       Medications Prior to Admission:    Prior to Admission medications    Medication Sig Start Date End Date Taking? Authorizing Provider   nystatin (MYCOSTATIN) 183543 UNIT/GM powder Apply 2-3 times daily under breasts to affected area. 9/11/20   Alline Anthony Peppel, APRN - CNP   acetaminophen (TYLENOL) 325 MG tablet Take 2 tablets by mouth every 4 hours as needed for Pain  Patient not taking: Reported on 9/18/2019 5/2/17   Patrice Elizalde DO       Allergies   Allergen Reactions    Anti-Inflammatory Enzyme [Nutritional Supplements]     Antihistamines, Diphenhydramine-Type        History reviewed. No pertinent family history. Social History     Tobacco Use    Smoking status: Never Smoker    Smokeless tobacco: Never Used   Substance Use Topics    Alcohol use: No    Drug use: No           PHYSICAL EXAM:    Vitals:    10/03/21 2330   BP: 108/72   Pulse: 102   Resp: 18   Temp: 98.7 °F (37.1 °C)   SpO2: 94%       General Appearance:  Laying in bed, awake, difficult to arouse  Head/face:  NC/AT  Eyes: PERRL, EOMI  ENT: Bilateral external ears WNL, Nares patent, Septum midline,   Neck:Supple, no adenopathy  Lungs:  Non-labored, poor respiratory effort, no stridor  Heart:  RR  Neuro: Facial nerve symmetric and intact.  House Brackmann 1/6, bilaterally. Endoscopy Procedure Note    Pre-operative Diagnosis: Hoarsness  Post-operative Diagnosis: same  Indications: Hoarseness, no voice production  Anesthesia: none  Endoscopy Type:  Flexible nasopharyngolaryngoscopy  Procedure Details   The nasal cavities were inspected to determine which side would be more amenable to the scope being passed through. The flexible nasopharyngolaryngoscope was passed through the left side(s) of the nose, and the nose, nasopharynx, oropharynx, hypopharynx and larynx were examined. Examination was performed during quiet respiration and with phonation. The following findings were noted. Findings:  Normal nasopharynx, normal epiglottis, normal tongue base, normal pyriform, poor TVC motion, appears to be immobility of left TVC as compared to right although there is poor mobility of both cords, no subglottic masses or obstructing lesions visualized. Condition:  Stable  Complications:  None      LABS:  CBC  Recent Labs     10/04/21  0357   WBC 17.7*   HGB 9.8*   HCT 32.0*   *       RADIOLOGY  XR SHOULDER RIGHT (MIN 2 VIEWS)    Result Date: 10/3/2021  EXAMINATION: THREE XRAY VIEWS OF THE RIGHT SHOULDER 10/3/2021 3:25 pm COMPARISON: Correlation is made to a chest radiograph from October 3, 2021 HISTORY: ORDERING SYSTEM PROVIDED HISTORY: recent fx, worsening pain TECHNOLOGIST PROVIDED HISTORY: Reason for exam:->recent fx, worsening pain What reading provider will be dictating this exam?->CRC FINDINGS: Demonstrated is displaced angulated fracture of the humeral neck. Appearance appears similar to the prior chest radiograph few. There is lucency remaining through the fracture.   There are multiple displaced right-sided rib fractures no additional acute findings are identified     Angulated fracture of the humeral neck Multiple displaced right-sided rib fractures noted     XR CHEST PORTABLE    Result Date: 10/3/2021  EXAMINATION: ONE XRAY VIEW OF THE CHEST 10/3/2021 3:24 pm COMPARISON: Comparison is dated April 28, 2017 HISTORY: ORDERING SYSTEM PROVIDED HISTORY: failure to thrive TECHNOLOGIST PROVIDED HISTORY: Reason for exam:->failure to thrive What reading provider will be dictating this exam?->CRC FINDINGS: Confluent infiltrate present within the right both lower lobes more prominent on the right. The apex is obscured by positioning Cardiac silhouette size is indeterminate it does appear enlarged on the previous study. No pleural effusion identified. Bilateral lower lobe infiltrates suspect pneumonia         ASSESSMENT:  80 y.o. female with with hoarseness. Poor mobility of both true vocal cords, left cord appears more immobile than right. Difficult to say whether this is from patient poor participation during study due to fatigue and clinical status vs neurogenic origin.      PLAN:  · Recommend speech evaluation and MBSS although given DNR-CC status this may not be in line with patient's desires  · Recommend NPO due to aspiration risk until evaluated further by speech pathology        Electronically signed by Salomon Pradhan DO on 10/4/21 at 8:55 AM EDT

## 2021-10-04 NOTE — PROGRESS NOTES
OT consult received and appreciated. Chart reviewed. Will hold evaluation due to ortho consult pending . Will evaluate at a later time. Thank you.  Ashanti Sorto, OTR/L #[id number]

## 2021-10-04 NOTE — PROGRESS NOTES
[facility] OF [facility]     Liaison Information Visit Note              Patient Name: Greg Berumen   :  1933  MRN:  [id number]  [facility] date:  10/3/2021   Hospital Admitting Physician:  Sussy Arora DO   PCP:  Sussy Arora DO  Primary Insurance: Payor: Sylvain November /  /  /    Emergency Contact:      Contact/Relation:   /         Phone:     Advance Directive  Patient has NOT completed an advance directive   Discussed with: Family member  DPOA-HC Name-Relation:    Phone:     Terminal Diagnosis will need to clarify- Medicare will not accept failure to thrive as terminal diagnosis     Current Hospital Problem List:   Patient Active Problem List   Diagnosis Code    Weakness R53.1    OA (osteoarthritis) M19.90    Osteopenia M85.80    Hard of hearing H91.90    Occult blood in stools R19.5    Humerus fracture S42.309A    Dehydration E86.0    Aspiration pneumonia of right upper lobe due to gastric secretions ([address] J69.0    Pneumonia J18.9       Code Status Order: DNR-CCA     Allergies:  Anti-inflammatory enzyme [nutritional supplements] and Antihistamines, diphenhydramine-type    Family Goal: undecided    Meeting held with spoke with daughter Jake Mcghee over the phone    The hospice benefit and philosophy were explained including that hospice is end of life care in which, per Medicare, a patient has a terminal diagnosis that life expectancy would be 6 months or less. Hospice care is a service that is covered by most insurance plans. The following levels of hospice care were discussed including, routine level of hospice care at private home or facility, which patient/family is responsible for any room and board fees at the facility, and general in patient level of care (GIP) at the [facility] for short term symptom management. Per Medicare guidelines, a patient is considered appropriate for GIP if they have uncontrolled symptoms such as pain, agitation, labored breathing or nausea/vomiting.   Once symptoms become managed, the patient would need to be moved to a lower level of care such as home with hospice, ECF with hospice or the Transition program.  [facility] transition program also explained which is routine hospice care provided at the [facility] instead of an ECF or home. The transition program is private pay $300/day for room/board. Room/board for the transition program is not covered by Medicaid as would be in an ECF. Family informed that with the routine level of care at home or ECF,  the hospice team consists of the RN who visits 1-3 times a week, a  who visits within the first five days of the hospice election, the personal care team who visit 1-3 times a week, non-medical volunteers and Chaplains. Explained that at home in routine level of care, familles are responsible for the 24 hour care. Discussed that under hospice care patient would not receive chemotherapy, radiation, immune therapy, IV antibiotics, dialysis or blood transfusions. Explained that once in hospice care, all aggressive treatments would be stopped and allow nature to takes its course with focus on comfort care for the patient. Marlyn Gallagher is interested in hospice services for the patient. I explained that failure to thrive is not a Medicare acceptable diagnosis. Marlyn Gallagher states that patient does not take any prescriptions medications and has no medical history of disease. Marlyn Aileen states that the mom is unable to feed herself since she broke her humerus in a fall a few weeks ago. Rekha feeds the patient but patient does not stay awake long enough to eat much. Swallow evaluation is pending and I explained that we need to see what this shows. Explained to Marlyn Gallagher that I would follow up tomorrow after swallow eval is completed and any lab work is done tomorrow. Marlyn Gallagher states she spoke with [facility] and they will take patient back. Discharge Plan:  Discharge Disposition; [place] plan:  1.   Will follow up with Blas Patton tomorrow and await further results for a possible terminal diagnosis. 2. Please call \[facility]\"" [phone] with any questions. 3. Patient not currently under the care of hospice.     Electronically signed by Nicolette Hodges RN on 10/4/2021 at 3:30 PM

## 2021-10-04 NOTE — PROGRESS NOTES
Comprehensive Nutrition Assessment    Type and Reason for Visit:  Initial, Positive Nutrition Screen    Nutrition Recommendations/Plan: Will start Ensure Enlive BID and Magic Cup once daily. Nutrition Assessment:  Pt admitted w/ dehydration, FTT, PNA, and humerus fx s/p fall. Functional swallow per SLP eval. Pt meets criteria for severe malnutrition w/ ongoing poor intake. Will start ONS and monitor. Malnutrition Assessment:  Malnutrition Status:  Severe malnutrition    Context:  Chronic Illness     Findings of the 6 clinical characteristics of malnutrition:  Energy Intake:  7 - 75% or less estimated energy requirements for 1 month or longer  Weight Loss:  Unable to assess (d/t lack of EMR wt hx)     Body Fat Loss:  7 - Severe body fat loss Orbital, Triceps   Muscle Mass Loss:  7 - Severe muscle mass loss Temples (temporalis), Clavicles (pectoralis & deltoids)  Fluid Accumulation:  No significant fluid accumulation     Strength:  Not Performed    Estimated Daily Nutrient Needs:  Energy (kcal):  5973-8765 (33-35 kcal/kg); Weight Used for Energy Requirements:  Current     Protein (g):  50-60 (1.5-1.8 gm/kg);  Weight Used for Protein Requirements:  Current        Fluid (ml/day):  0874-1474; Method Used for Fluid Requirements:  1 ml/kcal      Nutrition Related Findings:  Pt disoriented to situation, missing teeth, abd WDL, +BS, I/O WNL, trace BLE and +1 RUE edema      Wounds:  None       Current Nutrition Therapies:    ADULT DIET; Easy to Chew    Anthropometric Measures:  · Height: 4' 9\" (144.8 cm)  · Current Body Weight: 73 lb 3.2 oz (33.2 kg) (10/4 bed scale)   · Admission Body Weight: 73 lb 3.2 oz (33.2 kg) (10/4 bed scale)    · Usual Body Weight:  (TAMMI d/t lack of EMR recent wt hx)     · Ideal Body Weight: 85 lbs; % Ideal Body Weight 86.1 %   · BMI: 15.8  · Adjusted Body Weight: No Adjustment    · BMI Categories: Underweight (BMI less than 22) age over 72       Nutrition Diagnosis:   · Severe

## 2021-10-04 NOTE — PROGRESS NOTES
SPEECH/LANGUAGE PATHOLOGY  CLINICAL ASSESSMENT OF SWALLOWING FUNCTION   and PLAN OF CARE  PATIENT NAME:  Yolanda Corado  (female)     MRN:  [id number]    :  1933  (80 y.o.)  STATUS:  Inpatient: Room 5402/5402-A    TODAY'S DATE:  10/4/2021  REFERRING PROVIDER:   Dr. Ryan Conway: SLP swallowing-dysphagia evaluation and treatment Date of order:  10-3-21  REASON FOR REFERRAL: dysphagia   EVALUATING THERAPIST: AMRIT Napoles                 ASSESSMENT:    DYSPHAGIA DIAGNOSIS:   Clinical indicators of normal swallow function      DIET RECOMMENDATIONS:  Easy to chew consistency solids with  thin liquids     FEEDING RECOMMENDATIONS:     Assistance level:  No assistance needed      Compensatory strategies recommended: No strategies are recommended at this time      Discussed recommendations with nursing?: No secondary to no diet/liquid change recommended     SPEECH THERAPY  PLAN OF CARE   The dysphagia POC is established based on physician order, dysphagia diagnosis and results of clinical assessment     Dysphagia therapy is not recommended     Conditions Requiring Skilled Therapeutic Intervention for dysphagia:    not applicable    Specific dysphagia interventions to include:     Not applicable    Specific instructions for next treatment:  not applicable   Patient Treatment Goals:    Short Term Goals:  Not applicable no therapy warranted     Long Term Goals:   Not applicable no therapy warranted      Patient/family Goal:    not applicable                    ADMITTING DIAGNOSIS: Dehydration [E86.0]  Failure to thrive in adult [R62.7]  Aspiration pneumonia of right lower lobe due to gastric secretions (HCC) [J69.0]  Aspiration pneumonia of right upper lobe due to gastric secretions ([address] [J69.0]    VISIT DIAGNOSIS:   Visit Diagnoses       Codes    Aspiration pneumonia of right lower lobe due to gastric secretions ([address]    -  Primary J69.0    Failure to thrive in adult     R62.7           PATIENT REPORT/COMPLAINT: none stated    nursing not available at time of evaluation chart reviewed and patient is not NPO for any procedures per current documentation. PRIOR LEVEL OF SWALLOW FUNCTION:    PAST HISTORY OF DYSPHAGIA?: none reported    Home diet: Regular consistency solids with  thin liquids  PROCEDURE:  Consistencies Administered During the Evaluation   Liquids: thin liquid   Solids:  pureed foods and soft solid foods      Method of Intake:   cup, straw, spoon  Self fed      Position:   Seated, upright    CLINICAL ASSESSMENT  Oral Stage: The oral stage of swallowing was within functional limits      Pharyngeal Stage:    No signs of aspiration were noted during this evaluation however, silent aspiration cannot be ruled out at bedside. If silent aspiration is suspected, a Videofluoroscopic Study of Swallowing (MBS) is recommended and requires a physician order. Volitional Swallow: Present    Volitional Cough:    Present    Pain: No pain reported. EDUCATION:   The Speech Language Pathologist (SLP) completed education regarding results of evaluation and that intervention is not warranted at this time. Learner: Patient  Education:  Reviewed results and recommendations of this evaluation  Evaluation of Education: Verbalizes understanding    This plan will be re-evaluated and revised in 1 week  if warranted. CPT code:  26864  bedside swallow eval      [x]The admitting diagnosis and active problem list, have been reviewed prior to initiation of this evaluation.         ACTIVE PROBLEM LIST:   Patient Active Problem List   Diagnosis    Weakness    OA (osteoarthritis)    Osteopenia    Hard of hearing    Occult blood in stools    Humerus fracture    Dehydration    Aspiration pneumonia of right upper lobe due to gastric secretions (HCC)    Pneumonia

## 2021-10-04 NOTE — PROGRESS NOTES
Dr Kingsley Pinzon saw ENT note and no new orders at this time. Will wait for Speech therapy to see.

## 2021-10-05 NOTE — PROGRESS NOTES
Hospital Medicine    Subjective:  Pt with worsening mental status increasing tachypnea pt now dnrcc hospice      Current Facility-Administered Medications:     sodium chloride flush 0.9 % injection 5-40 mL, 5-40 mL, IntraVENous, 2 times per day, [place] Punch Malmer, DO, 10 mL at 10/03/21 2051    sodium chloride flush 0.9 % injection 5-40 mL, 5-40 mL, IntraVENous, PRN, [place] Punch Malmer, DO    0.9 % sodium chloride infusion, 25 mL, IntraVENous, PRN, Eliecer Punch Malmer, DO    ondansetron (ZOFRAN-ODT) disintegrating tablet 4 mg, 4 mg, Oral, Q8H PRN **OR** ondansetron (ZOFRAN) injection 4 mg, 4 mg, IntraVENous, Q6H PRN, [place] Punch Malmer, DO    polyethylene glycol (GLYCOLAX) packet 17 g, 17 g, Oral, Daily PRN, Eliecer Punch Malmer, DO    acetaminophen (TYLENOL) tablet 650 mg, 650 mg, Oral, Q6H PRN, 650 mg at 10/03/21 2122 **OR** acetaminophen (TYLENOL) suppository 650 mg, 650 mg, Rectal, Q6H PRN, [place] Punch Malmer, DO    Objective:    /72   Pulse 67   Temp 99.2 °F (37.3 °C) (Temporal)   Resp 16   Ht 4' 9\" (1.448 m)   Wt 73 lb 3.2 oz (33.2 kg)   SpO2 94%   BMI 15.84 kg/m²                            Pt lethargic              Heart:  Reg with ectopy  Lungs:  rhonchi  Abd: + bs soft nontender  Extrem:  r arm ecchymosis    CBC with Differential:    Lab Results   Component Value Date    WBC 17.7 10/04/2021    RBC 3.19 10/04/2021    HGB 9.8 10/04/2021    HCT 32.0 10/04/2021     10/04/2021    .3 10/04/2021    MCH 30.7 10/04/2021    MCHC 30.6 10/04/2021    RDW 14.3 10/04/2021    LYMPHOPCT 1.8 10/03/2021    MONOPCT 3.3 10/03/2021    BASOPCT 0.2 10/03/2021    MONOSABS 0.59 10/03/2021    LYMPHSABS 0.33 10/03/2021    EOSABS 0.01 10/03/2021    BASOSABS 0.03 10/03/2021     CMP:    Lab Results   Component Value Date     10/04/2021    K 4.1 10/04/2021     10/04/2021    CO2 26 10/04/2021    BUN 27 10/04/2021    CREATININE 0.4 10/04/2021    GFRAA >60 10/04/2021    LABGLOM >60 10/04/2021    GLUCOSE 114 10/04/2021    PROT 5.8 10/04/2021    LABALBU 3.2 10/04/2021    CALCIUM 8.3 10/04/2021    BILITOT 0.6 10/04/2021    ALKPHOS 163 10/04/2021    AST 17 10/04/2021    ALT 20 10/04/2021     Warfarin PT/INR:  No results found for: INR, PROTIME    Assessment:    Principal Problem:    Humerus fracture  Active Problems:    OA (osteoarthritis)    Hard of hearing    Dehydration    Pneumonia    Severe protein-calorie malnutrition (HCC)  Resolved Problems:    * No resolved hospital problems.  *  acute resp failure    Plan:  dnr cc hospice        Delphine Lubin DO  9:08 AM  10/5/2021

## 2021-10-05 NOTE — PROGRESS NOTES
Physical Therapy  Physical Therapy Initial Assessment     Name: Lianet Roland  : 1933  MRN: [id number]      Date of Service: 10/5/2021    Evaluating PT:  Mirna Arvizu PT, DPT [id number]    Room #:  8690/9843-X  Diagnosis:  Dehydration [E86.0]  Failure to thrive in adult [R62.7]  Aspiration pneumonia of right lower lobe due to gastric secretions (HCC) [J69.0]  Aspiration pneumonia of right upper lobe due to gastric secretions ([address] [J69.0]  PMHx/PSHx:  None on file  Precautions:  Falls, cognition, [place], O2 via NC, NWB RUE with sling  Equipment Needs:  TBD    SUBJECTIVE:  Pt is limited historian d/t lethargy. [facility]. Declines use of AD. Staff assists with all ADLs. OBJECTIVE:   Initial Evaluation  Date: 10/5/2021 Treatment Short Term/ Long Term   Goals   AM-PAC 6 Clicks      Was pt agreeable to Eval/treatment? yes     Does pt have pain? No c/o pain     Bed Mobility  Rolling: maxA  Supine to sit: maxA  Sit to supine: maxA  Scooting: maxA  Rolling: Melissa  Supine to sit: Melissa  Sit to supine: Melissa  Scooting: Melissa   Transfers Sit to stand: dep  Stand to sit: dep  Stand pivot: NT  Sit to stand: Melissa  Stand to sit: Melissa  Stand pivot: Melissa SBQC   Ambulation    NT  25 feet with SBQC Melissa   Stair negotiation: ascended and descended  NT  NA   ROM BUE:  Per OT note  BLE:  WNL     Strength BUE:  Per OT note  BLE:  Not formally assessed     Balance Sitting EOB:  Melissa. Retropulsive. Severely kyphotic posture. Dynamic Standing:  NT  Sitting EOB:  SBA  Dynamic Standing:  Melissa SBQC     Pt is A & O x 2-3 (self, hospital with cues, year, stated it was august, not fully oriented to situation)  Sensation:  Pt denies numbness and tingling to extremities  Edema:  unremarkable    Vitals:  SPo2 91% on 2L NC. Patient education  Pt educated on role of PT intervention. Pt educated on safety in room with utilization of call light for assistance with mobility.   Pt educated on importance of maximizing OOB time by sit<>stand requiring dependent lift.  o Skilled repositioning in supine with HOB elevated for comfort. Pillows placed to support kyphotic posturing. Pillow under BLE for pressure relief and comfort.  o Skilled vitals monitoring as noted above.  o Pt education as noted above. Pt's/ family goals   1. Return to [facility]. Prognosis is good for reaching above PT goals. Patient and or family understand(s) diagnosis, prognosis, and plan of care. yes    PHYSICAL THERAPY PLAN OF CARE:    PT POC is established based on physician order and patient diagnosis     Referring provider/PT Order:    10/03/21 1945  PT eval and treat Start: 10/03/21 1945, End: 10/03/21 1945, ONE TIME, Standing Count: 1 Occurrences, R      Radha Elizalde,      Diagnosis:  Dehydration [E86.0]  Failure to thrive in adult [R62.7]  Aspiration pneumonia of right lower lobe due to gastric secretions ([address] [J69.0]  Aspiration pneumonia of right upper lobe due to gastric secretions ([address] [J69.0]  Specific instructions for next treatment:  Ambulate as tolerated. LE strengthening. Dynamic sitting/standing balance activities for improved overall functional mobility.     Current Treatment Recommendations:     [x] Strengthening to improve independence with functional mobility   [x] ROM to improve independence with functional mobility   [x] Balance Training to improve static/dynamic balance and to reduce fall risk  [x] Endurance Training to improve activity tolerance during functional mobility   [x] Transfer Training to improve safety and independence with all functional transfers   [x] Gait Training to improve gait mechanics, endurance and assess need for appropriate assistive device  [] Stair Training in preparation for safe discharge home and/or into the community   [x] Positioning to prevent skin breakdown and contractures  [x] Safety and Education Training   [x] Patient/Caregiver Education   [] HEP  [] Other     PT long term treatment goals are located in above grid    Frequency of treatments: 2-5x/week x 1-2 weeks. Time in  0825  Time out  0850    Total Treatment Time  15 minutes     Evaluation Time includes thorough review of current medical information, gathering information on past medical history/social history and prior level of function, completion of standardized testing/informal observation of tasks, assessment of data and education on plan of care and goals.     CPT codes:  [x] Low Complexity PT evaluation 15823  [] Moderate Complexity PT evaluation 52326  [] High Complexity PT evaluation 88787  [] PT Re-evaluation 60268  [] Gait training 12358 0 minutes  [] Manual therapy 19952 0 minutes  [x] Therapeutic activities 62993 15 minutes  [] Therapeutic exercises 20193 0 minutes  [] Neuromuscular reeducation 21482 0 minutes     Karina Ulloa, PT, DPT  [id number]

## 2021-10-05 NOTE — PROGRESS NOTES
[address]       Date:10/5/2021                                                  Patient Name: Michelle Parikh    MRN: [id number]    : 1933    Room: [address] [place] [address]      Evaluating OT: MELISSA Flood/L License #  [id number]       Referring Provider: Hermann Boss DO    Specific Provider Orders/Date: OT evaluation & treatment       Diagnosis:  Dehydration, R shoulder proximal humerus fracture (subacute)    Surgery:   Past Surgical History:   Procedure Laterality Date    HERNIA REPAIR      HYSTERECTOMY      TONSILLECTOMY      UPPER GASTROINTESTINAL ENDOSCOPY  2017        Pertinent Medical History: Aspiration pneumonia   History reviewed. No pertinent past medical history. Precautions:  Fall Risk, [place], O2 at 2L via [place], NWB R UE, Sling R UE, thin liquids/soft diet, cognition, bed alarm     Assessment of current deficits   [] Functional mobility  [x]ADLs  [x] Strength               [x]Cognition    [x] Functional transfers   [x] IADLs         [x] Safety Awareness   [x]Endurance    [x] Fine Coordination              [x] Balance      [] Vision/perception   [x]Sensation     []Gross Motor Coordination  [] ROM  [] Delirium                   [] Motor Control     OT PLAN OF CARE   OT POC based on physician orders, patient diagnosis and results of clinical assessment    Frequency/Duration: 2-4 days/wk for 2 weeks PRN   Specific OT Treatment Interventions to include:    Instruction/training on adapted ADL techniques and AE recommendations to increase functional independence within precautions  Training on energy conservation strategies, correct breathing pattern and techniques to improve independence/tolerance for self-care routine  Functional transfer/mobility training/DME recommendations for increased independence, safety, and fall prevention  Patient/Family education to increase follow through with safety techniques and functional independence  Recommendation of environmental modifications for increased safety with functional transfers/mobility and ADLs  Cognitive retraining/development of therapeutic activities to improve problem solving, judgement, memory, and attention for increased safety/participation in ADL/IADL tasks  Splinting/positioning for increased function, prevention of contractures, and improve skin integrity  Therapeutic exercise to improve motor endurance, ROM, and functional strength for ADLs/functional transfers  Therapeutic activities to facilitate/challenge dynamic balance, stand tolerance for increased safety and independence with ADLs  Therapeutic activities to facilitate gross/fine motor skills for increased independence with ADLs  Positioning to improve skin integrity, interaction with environment and functional independence    Recommended Adaptive Equipment:  TBD     Home Living: Pt from [facility]  Bathroom setup: accessible   Equipment owned: ww at [facility]    Prior Level of Function: assist with ADLs , full assist with IADLs; ambulated ww??  Pt. Is questionable historian  Driving: NA  Occupation: none stated    Pain Level: no c/o pain;   Cognition: A&O: 2-3/4 (self, year, hospital with cues); Follows 1 step directions   Memory:  Poor+   Sequencing:  Poor+   Problem solving:  Poor+   Judgement/safety:  poor     Functional Assessment:  AM-PAC Daily Activity Raw Score: 8/24   Initial Eval Status  Date: 10-5-21 Treatment Status  Date: STGs = LTGs  Time frame: 10-14 days   Feeding Max A with [place] to self feed, able to perform cup > mouth small sip of juice  SBA   Grooming Maximal Assist to wash face with [place] assist  Stand by Assist    UB Dressing Dep to change hospital gown & joel sling  Moderate Assist    LB Dressing Dep B socks  Moderate Assist    Bathing Dep  Mod Assist   Toileting Dep  Mod Assist    Bed Mobility  Supine to sit:  Max A   Sit to supine: Max A Supine to sit: Mod A   Sit to supine: Mod A    Functional Transfers Dep with partial sit <> stand to change linens  Mod A   Functional Mobility  NT   TBA   Balance Sitting:     Static:  Min A    Dynamic:Max A  Standing: NT     Activity Tolerance Poor+ lt. Ax. Fair with lt. Ax. Visual/  Perceptual Glasses: yes        Vitals spO2 on 2 L & HR remained WFL  WFL      AROM (PROM) Strength Additional Info:    RUE  Shld. NT  Elbow to 90 for sling placement  Wrist & Hand WFL NT good  and wfl FMC/dexterity noted during ADL tasks     LUE L shld. To 61  WFL distally 3+/5 good  and wfl FMC/dexterity noted during ADL tasks       Hearing: [place]  Sensation:  No c/o numbness or tingling B UE  Tone: WFL   Edema: none noted B UE    Comments: Upon arrival patient supine in bed, agreeable to OT, cleared by Nursing  Therapist facilitated ADLs/sitting balance during ADLs with focus on safety, technique & precautions. Pt. Instructed RE: safe transfers/mobility, ADLs, role of OT, treatment plan, recs. , prec. At end of session, patient returned to supine in optimal position, bed alarm intact, R UE supported & in sling, all needs met, RN notified, with call light and phone within reach, all lines and tubes intact. Overall patient demonstrated decreased strength, balance, independence & safety during completion of ADL/functional transfer/mobility tasks. Pt would benefit from continued skilled OT to increase safety and independence with completion of ADL/IADL tasks for functional independence and quality of life.     Treatment: OT treatment provided this date includes:    Instruction/training on safety and adapted techniques for completion of ADLs: to increase [place] in self care    Instruction/training on safe functional mobility/transfer techniques: with focus on safety, technique & precautions    Instruction/training on energy conservation/work simplification for completion of ADLs: techniques to increase [place] with self care ADLs & iADLs, work simplification to improve endurance    Proper Positioning/Alignment: for optimal healing, skin integrity to prevent breakdown, decrease edema   Skilled monitoring of vitals: to include BP, spO2 & HR during session   Sitting Balance/Tolerance- to increased balance & activity tolerance during ADLs as well as facilitate proper posture and/or positioning. Rehab Potential: Good for established goals     Patient / Family Goal: none stated      Patient and/or family were instructed on functional diagnosis, prognosis/goals and OT plan of care. Demonstrated poor+ understanding. Eval Complexity: Low    Time In: 8:25  Time Out: 8:55  Total Treatment Time: 20    Min Units   OT Eval Low 93538  x     OT Eval Medium 09611      OT Eval High 42995      OT Re-Eval [id number]       Therapeutic Ex 77384       Therapeutic Activities 64742       ADL/Self Care 10964  20  1   Orthotic Management 42394       Manual 77951     Neuro Re-Ed 27971       Non-Billable Time          Evaluation Time additionally includes thorough review of current medical information, gathering information on past medical history/social history and prior level of function, interpretation of standardized testing/informal observation of tasks, assessment of data and development of plan of care and goals. Socorro Escobar, OTR/L   License #  [id number]

## 2021-10-05 NOTE — DISCHARGE INSTR - COC
Continuity of Care Form    Patient Name: Gus Delaney   :  1933  MRN:  [id number]    Admit date:  10/3/2021  Discharge date:  ***10/5/21    Code Status Order: DNR-CC   Advance Directives:     Admitting Physician:  Harrison Cantu DO  PCP: Harrison Cantu DO    Discharging Nurse: [facility] Unit/Room#: 18/0-A  Discharging Unit Phone Number: ***[phone]    Emergency Contact:   Extended Emergency Contact Information  Primary Emergency Contact: Rekha Cho  Address: [address] Phone: [phone]  Relation: Child    Past Surgical History:  Past Surgical History:   Procedure Laterality Date    HERNIA REPAIR      HYSTERECTOMY      TONSILLECTOMY      UPPER GASTROINTESTINAL ENDOSCOPY  2017       Immunization History: There is no immunization history on file for this patient.     Active Problems:  Patient Active Problem List   Diagnosis Code    Weakness R53.1    OA (osteoarthritis) M19.90    Osteopenia M85.80    Hard of hearing H91.90    Occult blood in stools R19.5    Humerus fracture S42.309A    Dehydration E86.0    Aspiration pneumonia of right upper lobe due to gastric secretions (HCC) J69.0    Pneumonia J18.9    Severe protein-calorie malnutrition ([facility] [address] E43       Isolation/Infection:   Isolation          No Isolation        Patient Infection Status     Infection Onset Added Last Indicated Last Indicated By Review Planned Expiration Resolved Resolved By    None active    Resolved    COVID-19 Rule Out 10/03/21 10/03/21 10/03/21 COVID-19, Rapid (Ordered)   10/03/21 Rule-Out Test Resulted          Nurse Assessment:  Last Vital Signs: /72   Pulse 67   Temp 99.2 °F (37.3 °C) (Temporal)   Resp 16   Ht 4' 9\" (1.448 m)   Wt 73 lb 3.2 oz (33.2 kg)   SpO2 94%   BMI 15.84 kg/m²     Last documented pain score (0-10 scale): Pain Level: 0  Last Weight:   Wt Readings from Last 1 Encounters:   10/04/21 73 lb 3.2 oz (33.2 kg)     Mental Status:  disoriented and alert    IV Access:  - None    Nursing Mobility/ADLs:  Walking   Dependent  Transfer  Dependent  Bathing  Dependent  Dressing  Dependent  Toileting  Dependent  Feeding  Dependent  Med Admin  Dependent  Med Delivery   DID NOT HAVE ROUTINE ORAL MEDS ORDERED    Wound Care Documentation and Therapy:        Elimination:  Continence:   · Bowel: No  · Bladder: No  Urinary Catheter: None   Colostomy/Ileostomy/Ileal Conduit: No       Date of Last BM: ***NO BOWEL MOVEMENTS RECORDED    Intake/Output Summary (Last 24 hours) at 10/5/2021 1129  Last data filed at 10/5/2021 0538  Gross per 24 hour   Intake 1982.5 ml   Output --   Net 1982.5 ml     I/O last 3 completed shifts: In: 2082.5 [P.O.:400; I.V.:1482.5; IV Piggyback:200]  Out: -     Safety Concerns: At Risk for Falls and Aspiration Risk    Impairments/Disabilities:      Hearing    Nutrition Therapy:  Current Nutrition Therapy:   - Oral Diet:  General and EASY TO CHEW THIN LIQUIDS    Routes of Feeding: Oral  Liquids: Thin Liquids  Daily Fluid Restriction: no  Last Modified Barium Swallow with Video (Video Swallowing Test): HAD A SWALLOW STUDY YESTERDAY AND PASSED PER SPEECH    Treatments at the Time of Hospital Discharge:   Respiratory Treatments: ***  Oxygen Therapy:  is on oxygen at 2 L/min per nasal cannula.   Ventilator:    - No ventilator support\    Rehab Therapies: {THERAPEUTIC INTERVENTION:6448837708}  Weight Bearing Status/Restrictions: No weight bearing restirctions  Other Medical Equipment (for information only, NOT a DME order):  {EQUIPMENT:254575308}  Other Treatments: ***    Patient's personal belongings (please select all that are sent with patient):  {[facility] DME Belongings:637195447}    RN SIGNATURE:  {Esignature:118709692}DAVID WHITFIELD RN    CASE MANAGEMENT/SOCIAL WORK SECTION    Inpatient Status Date: ***    Readmission Risk Assessment Score:  Readmission Risk              Risk of Unplanned Readmission:  13           Discharging to Facility/ Agency   · Name:   · Address:  · Phone:  · Fax:    Dialysis Facility (if applicable)   · Name:  · Address:  · Dialysis Schedule:  · Phone:  · Fax:    / signature: {Esignature:[id number]}    PHYSICIAN SECTION    Prognosis: {Prognosis:0425479392}    Condition at Discharge: Monisha Armstrong Patient Condition:419824599}    Rehab Potential (if transferring to Rehab): {Prognosis:6678221762}    Recommended Labs or Other Treatments After Discharge: ***    Physician Certification: I certify the above information and transfer of Reyes Bradley  is necessary for the continuing treatment of the diagnosis listed and that she requires {Admit to Appropriate Level of Care:39870} for {GREATER/LESS:840304481} 30 days.      Update Admission H&P: {CHP DME Changes in CTEMA:259266850}    PHYSICIAN SIGNATURE:  {Esignature:618845894}Eusebio Elizalde, DO

## 2021-10-05 NOTE — PROGRESS NOTES
Physician Progress Note      PATIENTCoeladia Weldon  CSN #:                  [id number]  :                       1933  ADMIT DATE:       10/3/2021 2:22 PM  [address] [place] DATE:  RESPONDING  PROVIDER #:        Beverely Curling DO          QUERY TEXT:    Pt admitted with Pneumonia, FTT, dehydration. Pt noted to have elevated WBC of   18, tachycardia 106, Respiratory rate 26, . If possible, please document in   the progress notes and discharge summary if you are evaluating and /or   treating any of the following: The medical record reflects the following:  Risk Factors: pneumonia, FTT, Cachexia, severe protein-calorie malnutrition  Clinical Indicators: Admit VS-98 temp, RR 26, , /81, 90% room   air,96% 4L n/c, BMI 17. 3. with chills, and scattered bilateral rales heard on   assessment Admit labs-WBC 18,C19 neg, Procal 0.24,UCx- growth not present   incubation continues. Temp >99.1 on 10/3, 99.2 on 10/4  Treatment: Unasyn, 1L NS bolus, oxygen, consults, serial labs, chest x-ray,   med-surg admit    Thank You,  Mariola CORDOVA, RN, CDS  CDI  [email or web address]  [phone]  Options provided:  -- Sepsis, present on admission  -- Other - I will add my own diagnosis  -- Disagree - Not applicable / Not valid  -- Disagree - Clinically unable to determine / Unknown  -- Refer to Clinical Documentation Reviewer    PROVIDER RESPONSE TEXT:    This patient has sepsis which was present on admission.     Query created by: Romulo Mancuso on 10/5/2021 10:24 AM      Electronically signed by:  Beverely Curling DO 10/5/2021 12:44 PM

## 2021-10-05 NOTE — PROGRESS NOTES
Spoke with Reji Jiménez, [facility] who did confirm with [facility] that patient can return there with HOTV. I placed call to DON at [facility] to confirm any equipment needs and physician following- left message with her voicemail. Call to daughter Zhou Gatica to update and received voicemail, left message. Terminal diagnosis of severe protein calorie malnutrition per Dr. Mi Goodman. Zhou Gatica did call back and I updated her.

## 2021-10-05 NOTE — CARE COORDINATION
Hospice has accepted the pt. Spoke to Liaison from [facility]. The pt may return with hospice services. Transportation has been arranged for 5:30 with [facility] ambulance. Facility, nursing and daughter notified.  Carola Briggs -[phone]

## 2021-10-06 NOTE — DISCHARGE SUMMARY
[address]                  [address] [facility],  [facility]                               DISCHARGE SUMMARY    PATIENT NAME: Mali Jeffery                      :        1933  MED REC NO:   [id number]                            ROOM:       5402  ACCOUNT NO:   [de-identified]                           ADMIT DATE: 10/03/2021  PROVIDER:     Loren Hinkle DO                  DISCHARGE DATE:  10/05/2021    DISCHARGE DIAGNOSES:  1. Right humerus fracture. 2.  Pneumonia. 3.  Dehydration. 4.  Hard of hearing. 5.  Osteoarthritis. 6.  Severe protein-calorie malnutrition. HOSPITAL COURSE:  The patient is an 26-year-old  female who  presented to the emergency room with complaints of weakness, decreased  oral intake, and hoarseness. She had a recent fall. She had a right  humerus fracture. She is admitted to the hospital.  She was seen by  Ear, Nose, and Throat for evaluation of hoarseness and Orthopedics. She  had significant weakness and decreased level of consciousness. The  patient was made a DNR-CC. Hospice was consulted. She was discharged  to skilled nursing facility with hospice in fair condition on  10/05/2021. DISCHARGE MEDICATIONS:  None.         Chaz Yadav DO    D: 10/06/2021 8:03:06       T: 10/06/2021 8:05:25     JUAN [email or web address]  Job#: [id number]     Doc#: [id number]    CC: